# Patient Record
(demographics unavailable — no encounter records)

---
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
## 2024-10-30 NOTE — DISCUSSION/SUMMARY
[Medication Risks Reviewed] : Medication risks reviewed [de-identified] : reviewed the case and the imaging with the patient  cont with bone stim  discussion of the condition and treatment options cautions discussed questions answered discussion of natural history of the condition and what the next step would be tylenol #3/flexeril fu 6 weeks  PT

## 2024-10-30 NOTE — PHYSICAL EXAM
[Normal Coordination] : normal coordination [Well Developed] : well developed [No Respiratory Distress] : no respiratory distress [de-identified] : str intact in the arms  [de-identified] : wound healing sutures removed

## 2024-12-21 NOTE — PHYSICAL EXAM
[Normal Coordination] : normal coordination [Well Developed] : well developed [No Respiratory Distress] : no respiratory distress [] : diminished ROM in all planes [de-identified] : wound healing sutures removed [de-identified] : weakness in the arms  [de-identified] : shoots into the arms
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
## 2024-12-21 NOTE — DISCUSSION/SUMMARY
[Medication Risks Reviewed] : Medication risks reviewed [de-identified] : reviewed the case and the imaging with the patient  healing from the c spine  PT  tramadol script  discussion of the condition and treatment options cautions discussed questions answered discussion of natural history of the condition and what the next step would be get a post op MRi C spine see if any obvious problem  tramadol/flexeril  not able to return to work

## 2024-12-21 NOTE — DISCUSSION/SUMMARY
[Medication Risks Reviewed] : Medication risks reviewed [de-identified] : reviewed the case and the imaging with the patient  healing from the c spine  PT  tramadol script  discussion of the condition and treatment options cautions discussed questions answered discussion of natural history of the condition and what the next step would be get a post op MRi C spine see if any obvious problem  tramadol/flexeril  not able to return to work
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
## 2024-12-21 NOTE — PHYSICAL EXAM
[Normal Coordination] : normal coordination [Well Developed] : well developed [No Respiratory Distress] : no respiratory distress [] : diminished ROM in all planes [de-identified] : wound healing sutures removed [de-identified] : weakness in the arms  [de-identified] : shoots into the arms
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
## 2025-03-05 NOTE — PHYSICAL EXAM
[Normal Coordination] : normal coordination [Well Developed] : well developed [No Respiratory Distress] : no respiratory distress [] : diminished ROM in all planes

## 2025-05-30 NOTE — PHYSICAL EXAM
[Normal Coordination] : normal coordination [Well Developed] : well developed [No Respiratory Distress] : no respiratory distress [] : diminished ROM in all planes [de-identified] : wound healing sutures removed [de-identified] : weakness in the arms  [de-identified] : shoots into the arms
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
## 2025-05-30 NOTE — DISCUSSION/SUMMARY
[Medication Risks Reviewed] : Medication risks reviewed [de-identified] : reviewed the case and the imaging with the patient  healing from the c spine  PT  tramadol script  discussion of the condition and treatment options cautions discussed questions answered discussion of natural history of the condition and what the next step would be get a post op MRi C spine see if any obvious problem  tramadol/flexeril  not able to return to work

## 2025-05-30 NOTE — PHYSICAL EXAM
[Normal Coordination] : normal coordination [Well Developed] : well developed [No Respiratory Distress] : no respiratory distress [] : diminished ROM in all planes [de-identified] : wound healing sutures removed [de-identified] : weakness in the arms  [de-identified] : shoots into the arms

## 2025-05-30 NOTE — DISCUSSION/SUMMARY
[Medication Risks Reviewed] : Medication risks reviewed [de-identified] : reviewed the case and the imaging with the patient  healing from the c spine  PT  tramadol script  discussion of the condition and treatment options cautions discussed questions answered discussion of natural history of the condition and what the next step would be get a post op MRi C spine see if any obvious problem  tramadol/flexeril  not able to return to work

## 2025-06-20 NOTE — DISCUSSION/SUMMARY
[Medication Risks Reviewed] : Medication risks reviewed [de-identified] : reviewed the case and the imaging with the patient  healing from the c spine  PT  tramadol script  discussion of the condition and treatment options cautions discussed questions answered discussion of natural history of the condition and what the next step would be get a post op MRi C spine see if any obvious problem  tramadol/flexeril  fu 2 months  not able to return to work

## 2025-06-20 NOTE — PHYSICAL EXAM
[Normal Coordination] : normal coordination [Well Developed] : well developed [No Respiratory Distress] : no respiratory distress [] : diminished ROM in all planes [de-identified] : wound healing sutures removed [de-identified] : weakness in the arms  [de-identified] : shoots into the arms
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
## 2025-06-20 NOTE — DISCUSSION/SUMMARY
[Medication Risks Reviewed] : Medication risks reviewed [de-identified] : reviewed the case and the imaging with the patient  healing from the c spine  PT  tramadol script  discussion of the condition and treatment options cautions discussed questions answered discussion of natural history of the condition and what the next step would be get a post op MRi C spine see if any obvious problem  tramadol/flexeril  fu 2 months  not able to return to work

## 2025-06-20 NOTE — PHYSICAL EXAM
[Normal Coordination] : normal coordination [Well Developed] : well developed [No Respiratory Distress] : no respiratory distress [] : diminished ROM in all planes [de-identified] : wound healing sutures removed [de-identified] : weakness in the arms  [de-identified] : shoots into the arms